# Patient Record
Sex: MALE | Race: WHITE | Employment: OTHER | URBAN - METROPOLITAN AREA
[De-identification: names, ages, dates, MRNs, and addresses within clinical notes are randomized per-mention and may not be internally consistent; named-entity substitution may affect disease eponyms.]

---

## 2017-02-22 ENCOUNTER — HOSPITAL ENCOUNTER (OUTPATIENT)
Dept: PHYSICAL THERAPY | Age: 67
Discharge: HOME | End: 2017-02-22
Payer: MEDICARE

## 2017-02-22 PROCEDURE — 97161 PT EVAL LOW COMPLEX 20 MIN: CPT

## 2017-02-22 PROCEDURE — G8978 MOBILITY CURRENT STATUS: HCPCS

## 2017-02-22 PROCEDURE — G8979 MOBILITY GOAL STATUS: HCPCS

## 2017-02-22 PROCEDURE — 97110 THERAPEUTIC EXERCISES: CPT

## 2017-02-22 NOTE — PROGRESS NOTES
PHYSICAL THERAPY - DAILY TREATMENT NOTE    Patient Name: Gerhard Kenney        Date: 2017  : 1950   YES Patient  Verified  Visit #:     Insurance: Payor: Liat Funez / Plan: [place] MEDICARE PART A & B / Product Type: Medicare /      In time: 1 Out time: 2   Total Treatment Time: 61     Medicare Time Tracking (below)   Total Timed Codes (min): 10 1:1 Treatment Time:  -     TREATMENT AREA =  R hip    SUBJECTIVE    Pain Level (on 0 to 10 scale):  4  / 10   Medication Changes/New allergies or changes in medical history, any new surgeries or procedures? NO    If yes, update Summary List   Subjective Functional Status/Changes:  []  No changes reported     See eval          OBJECTIVE    Modality rationale:  -    min [] Estim, type:                                          []  att     []  unatt     []  w/US     []  w/ice    []  w/heat    min []  Mechanical Traction: type/lbs                                               []  pro   []  sup   []  int   []  cont    min []  Ultrasound, settings/location:      min []  Iontophoresis:  []  take home patch w/ dexamethazone    min                                []  in clinic w/ dexamethazone    min []  Ice     []  Heat     position:     min []  Other:      10 min Therapeutic Exercise:  [x]  See flow sheet   []  Other:      []  Added:     to improve (function):    []  Changed:     to improve (function):       min Therapeutic Activity:      - min Manual Therapy:       min Gait Training:       min Patient Education:  YES  Reviewed HEP   []  Progressed/Changed HEP based on:         Other Objective/Functional Measures:    See eval     Post Treatment Pain Level (on 0 to 10) scale:   2  / 10     ASSESSMENT    []  See Progress Note/Recertification   Patient will continue to benefit from skilled therapy to address remaining functional deficits: see eval   Progress toward goals / Updated goals:    -     PLAN    [x]  Upgrade activities as tolerated YES Continue plan of care []  Discharge due to :    []  Other:      Therapist: Mallorie Louise PT    Date: 2/22/2017 Time: 1:54 PM

## 2017-02-22 NOTE — PROGRESS NOTES
[address] [place] [address] PHYSICAL THERAPY AT [facility] [address] [place] [address], [address] - Phone: [phone]  Fax: [phone] / [address] [facility]  Patient Name: Yadiel Catherine : 1950   Medical   Diagnosis: Right hip pain [M25.551] Treatment Diagnosis: R hip pain   Onset Date: 17     Referral Source: Marla Elizabeth MD [facility]): 2017   Prior Hospitalization: See medical history Provider #: [id number]   Prior Level of Function: Pain free ADLs   Comorbidities: HBP/heart disease, brain tumor   Medications: Verified on Patient Summary List   The Plan of Care and following information is based on the information from the initial evaluation.   ==============================================================================  Assessment / ward information:   Yadile Catherine is a 74 y.o. male with a chief c/o constant R hip pain (up to 9/10), that radiates as distal as his R ankle. He also reports R intermittent/daily R lateral leg numbness. He reports that on 17, he suffered a seizure (from a brain tumor) and apparently injured his R leg while the paramedics were transferring him onto a gurney. He has negative LE nerve tension tests. Mechanical back exam was inconclusive, with no signs of disc derangement or stenosis. He reports tenderness in his lateral hip at times, but none today, and no TrPs were found in his glute medius or lateral leg, though I suspect he could have some. He has dysfunctional multi-segmental flexion (limited by decreased HS length, at < 35 degrees). He has functional SLS L and R, but has dysfunctional multi-segmental extension, B rotation and overhead deep squat. In addition to decreased HS length, he has decreased quad and hip flexor length and decreased B hip ER/IR AROM/PROM (L hip ER = 20/25 degrees, and IR = 20/25 degrees, and R hip ER = 25/32 degrees, IR = 20/25 degrees). FOTO score  =  44. The patient would benefit from skilled physical therapy at this time.  ===========================================================================================  Eval Complexity: History LOW Complexity : Zero comorbidities / personal factors that will impact the outcome / POC;  Examination  HIGH Complexity : 4+ Standardized tests and measures addressing body structure, function, activity limitation and / or participation in recreation ; Presentation MEDIUM Complexity : Evolving with changing characteristics ; Decision Making MEDIUM Complexity : FOTO score of 26-74; Overall Complexity LOW   Problem List: pain affecting function, decrease ROM, decrease strength, edema affecting function, decrease ADL/ functional abilitiies, decrease activity tolerance and decrease flexibility/ joint mobility   Treatment Plan may include any combination of the following: Therapeutic exercise, Therapeutic activities, Neuromuscular re-education, Physical agent/modality, Manual therapy, Patient education and Other: Dry Needling  Patient / Family readiness to learn indicated by: asking questions, trying to perform skills and interest  Persons(s) to be included in education: patient (P)  Barriers to Learning/Limitations: None  Measures taken:    Patient Goal (s): \"eradicate pain\"   Patient self reported health status: good  Rehabilitation Potential: good   Short Term Goals: To be accomplished in  2  weeks:  1. Decrease max R hip pain to < 5/10   Long Term Goals: To be accomplished in  4-5  weeks:  1. All ADLs without R LE pain > 1/10  2. Pt to report >= +4 on GROC (a good deal better). 3.  Increase FOTO score to > 60, to indicate increased function  4.   Patient Independent with HEP/selfcare  Frequency / Duration:   Patient to be seen 2-3  times per week for 4-8  weeks:  Patient / Caregiver education and instruction: self care, activity modification and exercises  G-Codes (GP): Mobility [id number] Current  CK= 40-59%   Goal  CJ= 20-39%. The severity rating is based on the FOTO Score   Therapist Signature: Fabiola Welch PT, MDT Date: 1/50/5273   Certification Period: 2/22/17-5/20/17 Time: 1:57 PM   ===========================================================================================  I certify that the above Physical Therapy Services are being furnished while the patient is under my care. I agree with the treatment plan and certify that this therapy is necessary. Physician Signature:        Date:       Time:     Please sign and return to [facility] or you may fax the signed copy to [phone]. Thank you.

## 2017-02-24 ENCOUNTER — HOSPITAL ENCOUNTER (OUTPATIENT)
Dept: PHYSICAL THERAPY | Age: 67
End: 2017-02-24
Payer: MEDICARE

## 2017-02-27 ENCOUNTER — HOSPITAL ENCOUNTER (OUTPATIENT)
Dept: PHYSICAL THERAPY | Age: 67
Discharge: HOME | End: 2017-02-27
Payer: MEDICARE

## 2017-02-27 PROCEDURE — 97140 MANUAL THERAPY 1/> REGIONS: CPT

## 2017-02-27 PROCEDURE — 97110 THERAPEUTIC EXERCISES: CPT

## 2017-02-27 NOTE — PROGRESS NOTES
PHYSICAL THERAPY - DAILY TREATMENT NOTE      Patient Name: Kristine Marshall        Date: 2017  : 1950   YES Patient  Verified  Visit #:     Insurance: Payor: VA MEDICARE / Plan: VA MEDICARE PART A & B / Product Type: Medicare /      In time: 3:00 Out time: 3:45   Total Treatment Time: 39     Medicare Time Tracking (below)   Total Timed Codes (min):  45 1:1 Treatment Time:  30     TREATMENT AREA = Right hip pain [M25.551]    SUBJECTIVE    Pain Level (on 0 to 10 scale):  4  / 10   Medication Changes/New allergies or changes in medical history, any new surgeries or procedures? NO    If yes, update Summary List   Subjective Functional Status/Changes:  []  No changes reported     \"I feel great. It's just a toothache pain from the hip to the knee. I haven't had anything to the ankle since I came in here last.\"       OBJECTIVE    30/15 min Therapeutic Exercise:  [x]  See flow sheet   Rationale:      increase ROM and increase strength to improve the patients ability to walk       15 min Manual Therapy: DTM R piriformis, PROM hip IR/ER in prone   Rationale:      decrease pain, increase ROM, increase tissue extensibility and decrease trigger points to improve patient's ability to walk       min Patient Education:  YES  Reviewed HEP   []  Progressed/Changed HEP based on: Other Objective/Functional Measures:    Reproduced pain with deep palpation to R piriformis. Sig tightness in B hips R>L. Post Treatment Pain Level (on 0 to 10) scale:   4  / 10     ASSESSMENT    Assessment/Changes in Function:     Addressed piriformis tenderness/tightness with manual therapy. Updated HEP to include piriformis stretch.      []  See Progress Note/Recertification   Patient will continue to benefit from skilled PT services to modify and progress therapeutic interventions, address functional mobility deficits, address ROM deficits, address strength deficits, analyze and address soft tissue restrictions, analyze and cue movement patterns, analyze and modify body mechanics/ergonomics, assess and modify postural abnormalities and instruct in home and community integration to attain remaining goals. to attain remaining goals.    Progress toward goals / Updated goals:    Progressing towards STG     PLAN    []  Upgrade activities as tolerated YES Continue plan of care   []  Discharge due to :    []  Other:      Therapist: Sultana Harrington PT    Date: 2/27/2017 Time: 3:25 PM   Future Appointments  Date Time Provider Sixto Jean   3/1/2017 3:30 PM Sultana Harrington PT REHAB CENTER AT [address]   3/6/2017 1:00 PM Sultana Harrington PT REHAB CENTER AT [address]   3/8/2017 1:00 PM Sultana Harrington, PT REHAB CENTER AT [address]   3/13/2017 1:00 PM Sultana Harrington, PT REHAB CENTER AT [address]   3/15/2017 1:00 PM Bella Garcia, PT REHAB CENTER AT [address]   3/20/2017 1:00 PM Sultana Harrington PT REHAB CENTER AT [address]   3/22/2017 1:00 PM Sultana Harrington, PT REHAB CENTER AT [address]

## 2017-03-01 ENCOUNTER — HOSPITAL ENCOUNTER (OUTPATIENT)
Dept: PHYSICAL THERAPY | Age: 67
Discharge: HOME | End: 2017-03-01
Payer: MEDICARE

## 2017-03-01 PROCEDURE — 97110 THERAPEUTIC EXERCISES: CPT

## 2017-03-01 PROCEDURE — 97140 MANUAL THERAPY 1/> REGIONS: CPT

## 2017-03-01 NOTE — PROGRESS NOTES
PHYSICAL THERAPY - DAILY TREATMENT NOTE      Patient Name: Jatin Ryder        Date: 3/1/2017  : 1950   YES Patient  Verified  Visit #:   3   of   12  Insurance: Payor: Hilda Mcgrath / Plan: VA MEDICARE PART A & B / Product Type: Medicare /      In time: 3:30 Out time: 4:00   Total Treatment Time: 30     Medicare Time Tracking (below)   Total Timed Codes (min):  30 1:1 Treatment Time:  30     TREATMENT AREA = Right hip pain [M25.551]    SUBJECTIVE    Pain Level (on 0 to 10 scale):  0  / 10   Medication Changes/New allergies or changes in medical history, any new surgeries or procedures? NO    If yes, update Summary List   Subjective Functional Status/Changes:  []  No changes reported     \"I slept great that night after last time and I don't have any pain today. \"       OBJECTIVE    20 min Therapeutic Exercise:  [x]  See flow sheet   Rationale:      increase ROM and increase strength to improve the patients ability to walk       10 min Manual Therapy: DTM R piriformis   Rationale:      increase ROM, increase tissue extensibility and decrease trigger points to improve patient's ability to walk     min Patient Education:  YES  Reviewed HEP   []  Progressed/Changed HEP based on: Other Objective/Functional Measures:    Progressed hip strengthening exercises     Post Treatment Pain Level (on 0 to 10) scale:   0  / 10     ASSESSMENT    Assessment/Changes in Function:     Pt making great progress with decreasing R LE pain.   Tightness in R hip persists, lena with IR     []  See Progress Note/Recertification   Patient will continue to benefit from skilled PT services to modify and progress therapeutic interventions, address functional mobility deficits, address ROM deficits, address strength deficits, analyze and address soft tissue restrictions, analyze and cue movement patterns, analyze and modify body mechanics/ergonomics, assess and modify postural abnormalities and instruct in home and community integration to attain remaining goals. to attain remaining goals.    Progress toward goals / Updated goals:    STG #1-met     PLAN    []  Upgrade activities as tolerated YES Continue plan of care   []  Discharge due to :    []  Other:      Therapist: Geovanna Romero PT    Date: 3/1/2017 Time: 3:31 PM   Future Appointments  Date Time Provider Sixto Jean   3/6/2017 1:00 PM Geovanna Romero PT REHAB CENTER AT [facility]   3/8/2017 1:00 PM Geovanna Romero PT REHAB CENTER AT [facility]   3/13/2017 1:00 PM Geovanna Romero PT REHAB CENTER AT [facility]   3/15/2017 1:00 PM Alba Diaz PT REHAB CENTER AT [facility]   3/20/2017 1:00 PM Geovanna Romero PT REHAB CENTER AT [facility]   3/22/2017 1:00 PM Alba Diaz PT REHAB CENTER AT [facility]

## 2017-03-06 ENCOUNTER — HOSPITAL ENCOUNTER (OUTPATIENT)
Dept: PHYSICAL THERAPY | Age: 67
Discharge: HOME | End: 2017-03-06
Payer: MEDICARE

## 2017-03-06 PROCEDURE — 97140 MANUAL THERAPY 1/> REGIONS: CPT

## 2017-03-06 PROCEDURE — 97110 THERAPEUTIC EXERCISES: CPT

## 2017-03-06 NOTE — PROGRESS NOTES
PHYSICAL THERAPY - DAILY TREATMENT NOTE      Patient Name: Gracia Peterson        Date: 3/6/2017  : 1950   YES Patient  Verified  Visit #:     Insurance: Payor: Nia Beans / Plan: VA MEDICARE PART A & B / Product Type: Medicare /      In time: 12:55 Out time: 1:25   Total Treatment Time: 30     Medicare Time Tracking (below)   Total Timed Codes (min):  30 1:1 Treatment Time:  30     TREATMENT AREA = Right hip pain [M25.551]    SUBJECTIVE    Pain Level (on 0 to 10 scale):  0  / 10   Medication Changes/New allergies or changes in medical history, any new surgeries or procedures? NO    If yes, update Summary List   Subjective Functional Status/Changes:  []  No changes reported     \"I don't have any pain in my hip or down my leg but all my muscles are very sore. \"       OBJECTIVE    15 min Therapeutic Exercise:  [x]  See flow sheet   Rationale:      increase ROM and increase strength to improve the patients ability to walk       15 min Manual Therapy:  DTM R glutes, piriformis, hamstrings, low back   Rationale:      decrease pain, increase ROM, increase tissue extensibility and decrease trigger points to improve patient's ability to walk    Other Objective/Functional Measures: Focused on manual and stretching today due to increased soreness from exercise. Post Treatment Pain Level (on 0 to 10) scale:   0  / 10     ASSESSMENT    Assessment/Changes in Function:     Pt reports R hip pain remains 0/10. Soreness over the past week due to deconditioning.      []  See Progress Note/Recertification   Patient will continue to benefit from skilled PT services to modify and progress therapeutic interventions, address functional mobility deficits, address ROM deficits, address strength deficits, analyze and address soft tissue restrictions, analyze and cue movement patterns, analyze and modify body mechanics/ergonomics, assess and modify postural abnormalities and instruct in home and community integration to attain remaining goals. to attain remaining goals.    Progress toward goals / Updated goals:    Progressing towards all goals     PLAN    []  Upgrade activities as tolerated YES Continue plan of care   []  Discharge due to :    []  Other:      Therapist: Kandice Oseguera PT    Date: 3/6/2017 Time: 12:54 PM   Future Appointments  Date Time Provider Sixto Jean   3/6/2017 1:00 PM Kandice Oseguera PT REHAB CENTER AT [facility]   3/8/2017 1:00 PM Kandice Oseguera PT REHAB CENTER AT [facility]   3/13/2017 1:00 PM Kandice Oseguera PT REHAB CENTER AT [facility]   3/15/2017 1:00 PM Chris Archuleta PT REHAB CENTER AT [facility]   3/20/2017 1:00 PM Kandice Oseguera PT REHAB CENTER AT [facility]   3/22/2017 1:00 PM Chris Archuleta PT REHAB CENTER AT [facility]

## 2017-03-08 ENCOUNTER — HOSPITAL ENCOUNTER (OUTPATIENT)
Dept: PHYSICAL THERAPY | Age: 67
End: 2017-03-08
Payer: MEDICARE

## 2017-03-13 ENCOUNTER — HOSPITAL ENCOUNTER (OUTPATIENT)
Dept: PHYSICAL THERAPY | Age: 67
Discharge: HOME | End: 2017-03-13
Payer: MEDICARE

## 2017-03-13 PROCEDURE — G8980 MOBILITY D/C STATUS: HCPCS

## 2017-03-13 PROCEDURE — G8979 MOBILITY GOAL STATUS: HCPCS

## 2017-03-13 PROCEDURE — 97110 THERAPEUTIC EXERCISES: CPT

## 2017-03-13 PROCEDURE — 97530 THERAPEUTIC ACTIVITIES: CPT

## 2017-03-13 NOTE — PROGRESS NOTES
[address] PHYSICAL THERAPY AT [facility] [address] [place], [address]  Phone: [phone]  Fax: [phone] SUMMARY FOR PHYSICAL THERAPY          Patient Name: Leslie Rodriges : 1950   Treatment/Medical Diagnosis: Right hip pain [M25.551]   Onset Date: 17    Referral Source: Farrukh Block MD [facility]): 17   Prior Hospitalization: See Medical History Provider #: [id number]   Prior Level of Function: Pain free ADLs   Comorbidities: HBP/heart disease, brain tumor   Medications: Verified on Patient Summary List   Visits from [facility]: 5 Missed Visits: 0     Previous Goals:  1. All ADLs without R LE pain > 1/10  2. Pt to report >= +4 on GROC (a good deal better). 3. Increase FOTO score to > 60, to indicate increased function  4. Patient Independent with HEP/selfcare     Prior Level/Current Level:  1) Prior Level: n/a   Current Level: 0/10 pain with ADLs   Goal Met? yes  2) Prior Level: n/a   Current Level: +7 (a very great deal better)   Goal Met? yes  3) Prior Level: 44   Current Level: 73   Goal Met? yes  4) Prior Level: n/a   Current Level: independent with long term HEP   Goal Met? yes    Key Functional Changes/Progress: Pt has made excellent progress and no longer has any pain or numbness in R LE. He is able to walk, negotiate stairs, and squat without pain or difficulty. Pt has been educated in long term HEP to continue strengthening and prevent future injury. G-Codes (GP): Mobility  [id number] Goal  CJ= 20-39%  D/C  CJ= 20-39%. The severity rating is based on the FOTO Score    Assessments/Recommendations: Discontinue therapy. Progressing towards or have reached established goals. If you have any questions/comments please contact us directly at [phone]. Thank you for allowing us to assist in the care of your patient.     Therapist Signature: Felix Jones PT, DPT Date: 3/13/17   Reporting Period: 2/22/17-3/13/17 Time: 1:12 PM

## 2017-03-13 NOTE — PROGRESS NOTES
PHYSICAL THERAPY - DAILY TREATMENT NOTE      Patient Name: Gerhard Kenney        Date: 3/13/2017  : 1950   YES Patient  Verified  Visit #:     Insurance: Payor: Liat Funez / Plan: VA MEDICARE PART A & B / Product Type: Medicare /      In time: 12:45 Out time: 1:15   Total Treatment Time: 30     Medicare Time Tracking (below)   Total Timed Codes (min):  30 1:1 Treatment Time:  30     TREATMENT AREA = Right hip pain [M25.551]    SUBJECTIVE    Pain Level (on 0 to 10 scale):  0  / 10   Medication Changes/New allergies or changes in medical history, any new surgeries or procedures? NO    If yes, update Summary List   Subjective Functional Status/Changes:  []  No changes reported     \"No pain in my hip at all. The soreness has gotten better but I've still been taking it easy at home. \"       OBJECTIVE    20 min Therapeutic Exercise:  [x]  See flow sheet   Rationale:      increase ROM and increase strength to improve the patients ability to stand, walk       10 min Therapeutic Activity: Reviewed long term HEP   Rationale:      increase strength to improve the patients ability to walk      min Patient Education:  YES  Reviewed HEP   []  Progressed/Changed HEP based on: Other Objective/Functional Measures:    Pt no longer has pain or numbness in R LE. Educated in correct squatting techniques, lena keeping weight in heels and hip hinge motion. Updated HEP.      Post Treatment Pain Level (on 0 to 10) scale:   0  / 10     ASSESSMENT    Assessment/Changes in Function:     See D/C     []  See Progress Note/Recertification   Patient will continue to benefit from skilled PT services to modify and progress therapeutic interventions, address functional mobility deficits, address ROM deficits, address strength deficits, analyze and address soft tissue restrictions, analyze and cue movement patterns, analyze and modify body mechanics/ergonomics, assess and modify postural abnormalities and instruct in home and community integration to attain remaining goals. to attain remaining goals.    Progress toward goals / Updated goals:    See D/C     PLAN    []  Upgrade activities as tolerated YES Continue plan of care   []  Discharge due to :    []  Other:      Therapist: Martinez Zelaya PT    Date: 3/13/2017 Time: 12:46 PM   Future Appointments  Date Time Provider Sixto Jean   3/13/2017 1:00 PM Martinez Zelaya PT REHAB [facility]ENTER AT [facility]   3/15/2017 1:00 PM Nanci Odom PT REHAB CENTER AT [facility]   3/20/2017 1:00 PM Martinez Zelaya PT REHAB CENTER AT [facility]   3/22/2017 3:00 PM Nanci Odom PT REHAB CENTER AT [facility]

## 2017-03-15 ENCOUNTER — APPOINTMENT (OUTPATIENT)
Dept: PHYSICAL THERAPY | Age: 67
End: 2017-03-15
Payer: MEDICARE

## 2017-03-20 ENCOUNTER — APPOINTMENT (OUTPATIENT)
Dept: PHYSICAL THERAPY | Age: 67
End: 2017-03-20
Payer: MEDICARE

## 2017-03-22 ENCOUNTER — APPOINTMENT (OUTPATIENT)
Dept: PHYSICAL THERAPY | Age: 67
End: 2017-03-22
Payer: MEDICARE

## 2019-08-28 ENCOUNTER — HOSPITAL ENCOUNTER (OUTPATIENT)
Dept: PHYSICAL THERAPY | Age: 69
Discharge: HOME | End: 2019-08-28
Payer: MEDICARE

## 2019-08-28 PROCEDURE — 97110 THERAPEUTIC EXERCISES: CPT

## 2019-08-28 PROCEDURE — 97161 PT EVAL LOW COMPLEX 20 MIN: CPT

## 2019-08-28 PROCEDURE — 97140 MANUAL THERAPY 1/> REGIONS: CPT

## 2019-08-28 NOTE — PROGRESS NOTES
PHYSICAL THERAPY - DAILY TREATMENT NOTE      Patient Name: Isaiah Salamanca        Date: 2019  : 1950   YES Patient  Verified  Visit #:     Insurance: Payor: Huang Martin / Plan: VA MEDICARE PART A & B / Product Type: Medicare /      In time: 2:40 Out time: 3:30   Total Treatment Time: 50     Medicare Time/BCBS Tracking (below)   Total Timed Codes (min):  25 1:1 Treatment Time:  25     TREATMENT AREA = Lower back pain [M54.5]  Right leg pain [M79.604]     SUBJECTIVE    Pain Level (on 0 to 10 scale):  7  / 10   Medication Changes/New allergies or changes in medical history, any new surgeries or procedures?     NO    If yes, update Summary List   Subjective Functional Status/Changes:  []  No changes reported       See Plan of Care       OBJECTIVE    10 min Therapeutic Exercise:  [x]  See flow sheet   Rationale:      increase ROM and increase strength to improve the patients ability to perform ADLs     15 min Manual Therapy: Technique:      [] S/DTM []IASTM []PROM [] Passive Stretching   []manual TPR    []Jt manipulation:Gr I [] II []  III [] IV[] V[]  Treatment Area: S/L DTM right lumbar; MET correct pelvis, left lumbar rotation;prone left sacral PA mobs grade IV    Rationale:      decrease pain, increase ROM and increase tissue extensibility to improve patient's ability to perform ADLs    Billed With/As:   [x] TE   [] TA   [] Neuro   [] Self Care Patient Education: [x] Review HEP    [] Progressed/Changed HEP based on:   [] positioning   [] body mechanics   [] transfers   [] heat/ice application    [] other:      Other Objective/Functional Measures:    See Plan of Care     Post Treatment Pain Level (on 0 to 10) scale:    10     ASSESSMENT    Assessment/Changes in Function:     See Plan of Care     []  See Progress Note/Recertification   Patient will continue to benefit from skilled PT services to modify and progress therapeutic interventions, address functional mobility deficits, address ROM deficits, address strength deficits, analyze and address soft tissue restrictions, analyze and cue movement patterns, analyze and modify body mechanics/ergonomics and assess and modify postural abnormalities to attain remaining goals. to attain remaining goals.    Progress toward goals / Updated goals:    See Plan of Care     PLAN    [x]  Upgrade activities as tolerated YES Continue plan of care   []  Discharge due to :    []  Other:      Therapist: Unruly Colon PT    Date: 8/28/2019 Time: 3:47 PM     Future Appointments   Date Time Provider Sixto Jean   9/4/2019  3:00 PM Tammy Cortes REHAB CENTER AT [facility]   9/9/2019  3:30 PM Deborah Burrell, PT REHAB CENTER AT [facility]   9/12/2019  3:00 PM Tammy Cortes RE[facility] AT [facility]   9/16/2019  2:30 PM Clocem Burrell, PT REHAB CENTER AT [facility]   9/19/2019  2:30 PM Tammy Cortes RE[facility] AT [facility]   9/23/2019  2:30 PM Clocem Burrell, PT REHAB CENTER AT [facility]   9/26/2019  2:30 PM Tammy Cortes RE[facility]ENTER AT [facility]   9/30/2019  2:30 PM Clomilea Ashanti, PT REHAB CENTER AT [facility]

## 2019-08-28 NOTE — PROGRESS NOTES
[address]   PHYSICAL THERAPY AT [facility] [address], [address] - Phone: [phone]  Fax: [phone] / [address] [facility]  Patient Name: Rusty Lewis : 1950   Medical   Diagnosis: Lower back pain [M54.5]  Right leg pain [M79.604] Treatment Diagnosis: Lumbar and Right Hip Pain   Onset Date: 2019     Referral Source: Rhonda Alexander MD [facility]): 2019   Prior Hospitalization: See medical history Provider #: [id number]   Prior Level of Function: ADLs pain free   Comorbidities: Cardiac, hx brain tumor removal   Medications: Verified on Patient Summary List   The Plan of Care and following information is based on the information from the initial evaluation.   ===========================================================================================  Assessment / key information:  Rusty Lewis is a 68 y.o.  yo male with Dx of Lower back pain [M54.5]  Right leg pain [M79.604]. Patient reports onset of symptoms after bending and lifting about 4 weeks ago. Patient currently rates pain as 7/10 at worst, 0/10 at best, primarily located at lumbar and right posterior hip. Patient complains of difficulty and increase pain with bending and lifting, stair negotiation, crossing right leg, prolonged sitting, standing, walking. Objective Findings:  Lumbar ROM: Flx = decreased 75% , Ext = decreased 75%, Lat Flx R = decreased 50%, L = decreased 50%, Rot: R = decreased 50%, L = decreased 50%. Manual Muscle Testin-/5 right hip ext. 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 FOTO Score= 46 points.   Pt instructed in HEP and will f/u in clinic for PT.  ===========================================================================================  Eval Complexity: History HIGH Complexity :3+ comorbidities / personal factors will impact the outcome/ POC ;  Examination  HIGH Complexity : 4+ Standardized tests and measures addressing body structure, function, activity limitation and / or participation in recreation ; Presentation LOW Complexity : Stable, uncomplicated ;  Decision Making MEDIUM Complexity : FOTO score of 26-74; Overall Complexity LOW   Problem List: pain affecting function, decrease ROM, decrease strength, decrease ADL/ functional abilitiies, decrease activity tolerance and decrease flexibility/ joint mobility   Treatment Plan may include any combination of the following: Therapeutic exercise, Therapeutic activities, Neuromuscular re-education, Physical agent/modality, Manual therapy and Patient education  Patient / Family readiness to learn indicated by: asking questions, trying to perform skills and interest  Persons(s) to be included in education: patient (P)  Barriers to Learning/Limitations: None  Measures taken, if barriers to learning:    Patient Goal (s): \"Pain relief. \"   Patient self reported health status: fair  Rehabilitation Potential: good   Short Term Goals: To be accomplished in  2  weeks:  1. Patient will increase FOTO Score to 54 points to improve tolerance to ADLs. 2. Patient will achieve +2 on GROC to improve tolerance to ADLs.  Long Term Goals: To be accomplished in  4  weeks:  1. Patient will achieve +4 on GROC to improve tolerance to ADLs. 2.  Patient will increase FOTO Score to 63 to improve tolerance to ADLs. 3.  Patient will report 75% improvement in symptoms to improve tolerance to ADLs.   Frequency / Duration:   Patient to be seen  2-3  times per week for 4  weeks:  Patient / Caregiver education and instruction: exercises  Therapist Signature: MOODY Castrejon Date: 2/18/7070   Certification Period: 8/28/19-11/28/19 Time: 3:53 PM   ===========================================================================================  I certify that the above Physical Therapy Services are being furnished while the patient is under my care. I agree with the treatment plan and certify that this therapy is necessary. Physician Signature:        Date:       Time:     Please sign and return to [facility] [place] or you may fax the signed copy to [phone]. Thank you.

## 2019-09-04 ENCOUNTER — HOSPITAL ENCOUNTER (OUTPATIENT)
Dept: PHYSICAL THERAPY | Age: 69
Discharge: HOME | End: 2019-09-04
Payer: MEDICARE

## 2019-09-04 PROCEDURE — 97110 THERAPEUTIC EXERCISES: CPT

## 2019-09-04 PROCEDURE — 97140 MANUAL THERAPY 1/> REGIONS: CPT

## 2019-09-04 NOTE — PROGRESS NOTES
PHYSICAL THERAPY - DAILY TREATMENT NOTE    Patient Name: Constantin Brody        Date: 2019  : 1950    Patient  Verified: YES  Visit #:      of   12  Insurance: Payor: Ashlee Sharma / Plan: [place] B / Product Type: Medicare /      In time: 3:00 Out time: 4:05   Total Treatment Time: 65     Medicare Time Tracking (below)   Total Timed Codes (min):  55 1:1 Treatment Time:  40     TREATMENT AREA/ DIAGNOSIS = Lower back pain [M54.5]  Right leg pain [M79.604]    SUBJECTIVE  Pain Level (on 0 to 10 scale):  7  / 10   Medication Changes/New allergies or changes in medical history, any new surgeries or procedures? NO    If yes, update Summary List   Subjective Functional Status/Changes:  []  No changes reported     Pt has pain with transfers. Feels better laying down      OBJECTIVE  Modalities Rationale:     decrease inflammation and decrease pain to improve patient's ability to perform ADLs without pain     min [] Estim, type/location:                                      []  att     []  unatt     []  w/US     []  w/ice    []  w/heat    min []  Mechanical Traction: type/lbs                   []  pro   []  sup   []  int   []  cont    []  before manual    []  after manual    min []  Ultrasound, settings/location:      min []  Iontophoresis w/ dexamethasone, location:                                               []  take home patch       []  in clinic   5 min []  Ice     [x]  Heat    location/position: L/S     min []  Vasopneumatic Device, press/temp:     min []  Other:    [] Skin assessment post-treatment (if applicable):    []  intact    []  redness- no adverse reaction     []redness  adverse reaction:        50 min Therapeutic Exercise:  [x]  See flow sheet   Rationale:      increase strength to improve the patients ability to perform ADLs without pain       10 min Manual Therapy: STM to L/S. PROM to R hip.  MET to correct R ant rotated innominate    Rationale:      increase ROM and increase tissue extensibility to improve patient's ability to perform ADLs without pain      Billed With/As:   [x] TE   [] TA   [] Neuro   [] Self Care Patient Education: [x] Review HEP    [] Progressed/Changed HEP based on:   [] positioning   [] body mechanics   [] transfers   [] heat/ice application    [] other:        Other Objective/Functional Measures:    Initiated Plan of Care  Pt had no increase in pain during treatment session     Post Treatment Pain Level (on 0 to 10) scale:   5  / 10     ASSESSMENT  Assessment/Changes in Function:     Pt benefited from MET technique based on improved walking ability after technique. Pt tolerated therapeutic exercise well today     []  See Progress Note/Recertification   Patient will continue to benefit from skilled PT services to modify and progress therapeutic interventions, address functional mobility deficits, address ROM deficits, address strength deficits, analyze and address soft tissue restrictions and analyze and cue movement patterns to attain remaining goals.    Progress toward goals / Updated goals:    Slow Progress to    [] STG    [x] LTG  1 as shown by slight improvement in walking post treatment     PLAN  [x]  Upgrade activities as tolerated YES Continue plan of care   []  Discharge due to :    []  Other:      Therapist: Shirley Truong DPT     Date: 9/4/2019 Time: 3:23 PM        Future Appointments   Date Time Provider Sixto Jean   9/9/2019  3:30 PM Nela Hammond PT [facility]   9/12/2019  3:00 PM Krystal Severino [facility]   9/16/2019  2:30 PM Nela Hammond PT [facility]   9/19/2019  2:30 PM Krystal Severino [facility]   9/23/2019  2:30 PM Nela Hammond PT [facility]   9/26/2019  2:30 PM Krystal Severino [facility]   9/30/2019  2:30 PM Nela Hammond, PT [facility]

## 2019-09-09 ENCOUNTER — APPOINTMENT (OUTPATIENT)
Dept: PHYSICAL THERAPY | Age: 69
End: 2019-09-09
Payer: MEDICARE

## 2019-09-12 ENCOUNTER — APPOINTMENT (OUTPATIENT)
Dept: PHYSICAL THERAPY | Age: 69
End: 2019-09-12
Payer: MEDICARE

## 2019-09-16 ENCOUNTER — APPOINTMENT (OUTPATIENT)
Dept: PHYSICAL THERAPY | Age: 69
End: 2019-09-16
Payer: MEDICARE

## 2019-09-19 ENCOUNTER — APPOINTMENT (OUTPATIENT)
Dept: PHYSICAL THERAPY | Age: 69
End: 2019-09-19
Payer: MEDICARE

## 2019-09-23 ENCOUNTER — APPOINTMENT (OUTPATIENT)
Dept: PHYSICAL THERAPY | Age: 69
End: 2019-09-23
Payer: MEDICARE

## 2019-09-26 ENCOUNTER — APPOINTMENT (OUTPATIENT)
Dept: PHYSICAL THERAPY | Age: 69
End: 2019-09-26
Payer: MEDICARE

## 2019-09-30 ENCOUNTER — APPOINTMENT (OUTPATIENT)
Dept: PHYSICAL THERAPY | Age: 69
End: 2019-09-30
Payer: MEDICARE

## 2019-11-11 NOTE — PROGRESS NOTES
[address] [place] [address] PHYSICAL THERAPY AT [address], [address] [place] [address], [address], [address] [place] [address]  Phone: [phone]  Fax: [phone] SUMMARY  Patient Name: Uriel Lane : 1950   Treatment/Medical Diagnosis: Lower back pain [M54.5]  Right leg pain [M79.604]   Referral Source: Jodee Aguilar MD     Date of Initial Visit: 19 Attended Visits: 2 Missed Visits: 0     SUMMARY OF TREATMENT  Treatment focused on manual therapy and therex to improve lumbar and right hip pain. CURRENT STATUS  Patient attended 2 visits and requested discharge stating symptoms unchanged. Patient to follow up with MD.  Discharge at this time per patient request.    RECOMMENDATIONS  Other: D/C per patient request  If you have any questions/comments please contact us directly at [phone]. Thank you for allowing us to assist in the care of your patient.     Therapist Signature: Katarina Grier PT Date: 19     Time: 11:42 AM